# Patient Record
Sex: FEMALE | NOT HISPANIC OR LATINO | URBAN - METROPOLITAN AREA
[De-identification: names, ages, dates, MRNs, and addresses within clinical notes are randomized per-mention and may not be internally consistent; named-entity substitution may affect disease eponyms.]

---

## 2021-10-08 ENCOUNTER — OFFICE VISIT (OUTPATIENT)
Dept: URBAN - METROPOLITAN AREA CLINIC 33 | Facility: CLINIC | Age: 81
End: 2021-10-08

## 2021-10-08 NOTE — HPI-MIGRATED HPI
;     Diverticulitis : 81 y/o [place] patient presents today with diverticulitis which was recently diagnosed via CT/US of [facility] on ??? .  Patient states that he has been experiencing -- abdominal pain for - days. The course / duration of symptoms is persistent/intermittent. The course/duration of symptoms is constant and fluctuating in intensification. The degree of symptoms is moderate to severe. The exacerbating factor is eating. The relieving factor is none.  Patient admits to ? watery/semi- formed/formed bowel movements per day, with no mucus, melena, or blood in stool.  Patient denies bloating/gas, indigestion, or changes in bowel habits. Patient denies associated epigastric pain, nausea, vomiting, fever, chills, dizziness,  dysphagia, globus, sour eructations,  early satiety, changes in appetite, coughing.  Patient denies/admits ???  past colonoscopy. Patient denies ???  family  hx of colon disease or cancer.   ;

## 2021-11-17 ENCOUNTER — WEB ENCOUNTER (OUTPATIENT)
Dept: URBAN - METROPOLITAN AREA CLINIC 35 | Facility: CLINIC | Age: 81
End: 2021-11-17

## 2021-11-18 ENCOUNTER — OFFICE VISIT (OUTPATIENT)
Dept: URBAN - METROPOLITAN AREA CLINIC 33 | Facility: CLINIC | Age: 81
End: 2021-11-18

## 2021-11-18 ENCOUNTER — DASHBOARD ENCOUNTERS (OUTPATIENT)
Age: 81
End: 2021-11-18

## 2021-11-18 VITALS
BODY MASS INDEX: 15.09 KG/M2 | WEIGHT: 82 LBS | OXYGEN SATURATION: 95 % | HEART RATE: 99 BPM | HEIGHT: 62 IN | SYSTOLIC BLOOD PRESSURE: 100 MMHG | DIASTOLIC BLOOD PRESSURE: 70 MMHG

## 2021-11-18 PROBLEM — 399291005: Status: ACTIVE | Noted: 2021-11-18

## 2021-11-18 RX ORDER — CARBIDOPA AND LEVODOPA 25; 250 MG/1; MG/1
TAKE 1 TABLET BY MOUTH FOUR TIMES DAILY DIAGNOSIS UNAVAILABLE TABLET ORAL
Qty: 20 | Refills: 2 | Status: ACTIVE | COMMUNITY

## 2021-11-18 RX ORDER — UBIDECARENONE 30 MG
AS DIRECTED CAPSULE ORAL
Status: ACTIVE | COMMUNITY

## 2021-11-18 RX ORDER — ESCITALOPRAM OXALATE 10 MG/1
TAKE 1 TABLET BY MOUTH AT BEDTIME DIAGNOSIS UNAVAILABLE TABLET, FILM COATED ORAL
Qty: 90 | Refills: 0 | Status: ACTIVE | COMMUNITY

## 2021-11-18 RX ORDER — DONEPEZIL HYDROCHLORIDE 10 MG/1
TAKE 1 TABLET BY MOUTH AT BEDTIME DIAGNOSIS UNAVAILABLE TABLET, FILM COATED ORAL
Qty: 90 | Refills: 1 | Status: ACTIVE | COMMUNITY

## 2021-11-18 RX ORDER — PANTOPRAZOLE SODIUM 20 MG/1
1 TABLET TABLET, DELAYED RELEASE ORAL
Qty: 90 | Refills: 1 | OUTPATIENT
Start: 2021-11-18

## 2021-11-18 RX ORDER — QUETIAPINE 150 MG/1
TABLET, FILM COATED, EXTENDED RELEASE ORAL
Qty: 30 | Status: ACTIVE | COMMUNITY

## 2021-11-18 RX ORDER — ALPRAZOLAM 0.5 MG/1
1 TABLET TABLET ORAL TWICE A DAY
Status: ACTIVE | COMMUNITY

## 2021-11-18 RX ORDER — PIMAVANSERIN TARTRATE 34 MG/1
CAPSULE ORAL
Qty: 90 | Status: ACTIVE | COMMUNITY

## 2021-11-18 RX ORDER — DOCUSATE SODIUM 100 MG/1
2 CAPSULES CAPSULE ORAL ONCE A DAY
Qty: 60 CAPSULE | Status: ACTIVE | COMMUNITY

## 2021-11-18 NOTE — HPI-MIGRATED HPI
;   ;     Constipation : 1 BM's every 2-3 days with the use of a suppository with hard stool.  admits patient has always had an issue with constipation. He has had to manually remove stool  from rectum 4 times in the past 4 months.   GI MD Note: constipation is chronic but also getting worse. ;   Gastroesophageal Reflux : 80 year old female presents today with her  for consultation of GERD. Patient has hx of  pretty advanced Parkinson's disease;  is her total care giver. For the last 6-8 weeks, patient will cough up globs of sour phlegm without blood, this will occur 2-3 times through the day. Cough will wake her through the night and keeps her up for about an hour.  Occasional heartburn.  Patient also complains of dysphagia for the past couple of months. She has trouble swallowing food, and large pills; the issue swallowing food and pills is that it triggers cough. No issues with liquids.  Patient has had a 8 lb weight loss in the past year. She was 90 lbs a year ago. She has never been a big lady. The most she has weighed was 110.   ER at [facility], Saturday night which her  believes her presentation was due to a panic attack. Patient was shaking and trembling  She denies previous EGD/Colonoscopy  Mother dx with colon cancer in her early 60's   GI MD Note: hx above is taken pretty much from her .  describes some type of imaging done at [facility] showing patient has a Zenker's diverticulum. We do not have report at the time of office visit.  ;

## 2021-11-18 NOTE — EXAM-MIGRATED EXAMINATIONS
GENERAL APPEARANCE: - alert, in no acute distress, frail looking, too thin;   HEAD: - normocephalic, atraumatic;   EYES: - sclera anicteric bilaterally;   ORAL CAVITY: - mucosa moist;   THROAT: - clear;   NECK/THYROID: - neck supple, full range of motion, no thyromegaly, trachea midline;   SKIN: - warm and dry, no spider angiomata, palmar erythema or icterus;   HEART: - no murmurs, regular rate and rhythm, S1, S2 normal;   LUNGS: - clear to auscultation bilaterally, good air movement, no wheezes, rales, rhonchi;   ABDOMEN: - bowel sounds present, no masses palpable, no organomegaly , no rebound tenderness, soft, nontender, nondistended;   MUSCULOSKELETAL: - Slow gate c/w known Parkinson's Disease;   EXTREMITIES: - no clubbing, cyanosis, or edema;   PSYCH: - cooperative with exam, mood/affect full range;

## 2021-12-17 ENCOUNTER — OFFICE VISIT (OUTPATIENT)
Dept: URBAN - METROPOLITAN AREA MEDICAL CENTER 10 | Facility: MEDICAL CENTER | Age: 81
End: 2021-12-17

## 2022-01-06 ENCOUNTER — OFFICE VISIT (OUTPATIENT)
Dept: URBAN - METROPOLITAN AREA CLINIC 33 | Facility: CLINIC | Age: 82
End: 2022-01-06

## 2022-01-06 NOTE — HPI-DYSPHAGIA
Last visit 11/18/2021 80 year old female presents today with her  for consultation of GERD. Patient has hx of pretty advanced Parkinson's disease;  is her total care giver. For the last 6-8 weeks, patient will cough up globs of sour phlegm without blood, this will occur 2-3 times through the day. Cough will wake her through the night and keeps her up for about an hour.         Occasional heartburn.        Patient also complains of dysphagia for the past couple of months. She has trouble swallowing food, and large pills; the issue swallowing food and pills is that it triggers cough. No issues with liquids.        Patient has had a 8 lb weight loss in the past year. She was 90 lbs a year ago. She has never been a big lady. The most she has weighed was 110.         ER at [facility], Saturday night which her  believes her presentation was due to a panic attack. Patient was shaking and trembling        She denies previous EGD/Colonoscopy        Mother dx with colon cancer in her early 60's        GI MD Note: hx above is taken pretty much from her .  describes some type of imaging done at [facility] showing patient has a Zenker's diverticulum. We do not have report at the time of office visit.

## 2022-01-06 NOTE — HPI-CONSTIPATION
Last visit 11/18/2021        1 BM's every 2-3 days with the use of a suppository with hard stool.  admits patient has always had an issue with constipation. He has had to manually remove stool from rectum 4 times in the past 4 months.         GI MD Note: constipation is chronic but also getting worse

## 2022-01-13 ENCOUNTER — OFFICE VISIT (OUTPATIENT)
Dept: URBAN - METROPOLITAN AREA CLINIC 33 | Facility: CLINIC | Age: 82
End: 2022-01-13